# Patient Record
Sex: FEMALE | Race: WHITE | NOT HISPANIC OR LATINO
[De-identification: names, ages, dates, MRNs, and addresses within clinical notes are randomized per-mention and may not be internally consistent; named-entity substitution may affect disease eponyms.]

---

## 2024-03-27 ENCOUNTER — APPOINTMENT (OUTPATIENT)
Dept: PLASTIC SURGERY | Facility: CLINIC | Age: 69
End: 2024-03-27
Payer: SELF-PAY

## 2024-03-27 PROCEDURE — 99212 OFFICE O/P EST SF 10 MIN: CPT | Mod: NC

## 2024-04-04 PROBLEM — Z00.00 ENCOUNTER FOR PREVENTIVE HEALTH EXAMINATION: Status: ACTIVE | Noted: 2024-04-04

## 2024-04-04 NOTE — REASON FOR VISIT
[TextEntry] : consult on injectables. She made an appointment with Dr. Luong. no charge. Cheryle came to the event and her daughter is getting  in the fall. no charge

## 2024-04-18 ENCOUNTER — APPOINTMENT (OUTPATIENT)
Dept: PLASTIC SURGERY | Facility: CLINIC | Age: 69
End: 2024-04-18
Payer: SELF-PAY

## 2024-04-18 DIAGNOSIS — L98.8 OTHER SPECIFIED DISORDERS OF THE SKIN AND SUBCUTANEOUS TISSUE: ICD-10-CM

## 2024-04-18 DIAGNOSIS — Z85.3 PERSONAL HISTORY OF MALIGNANT NEOPLASM OF BREAST: ICD-10-CM

## 2024-04-18 DIAGNOSIS — Z86.79 PERSONAL HISTORY OF OTHER DISEASES OF THE CIRCULATORY SYSTEM: ICD-10-CM

## 2024-04-18 PROCEDURE — 99202 OFFICE O/P NEW SF 15 MIN: CPT

## 2024-04-18 RX ORDER — LEVOTHYROXINE SODIUM 88 UG/1
88 TABLET ORAL
Refills: 0 | Status: ACTIVE | COMMUNITY

## 2024-04-18 RX ORDER — HYDROXYUREA 500 MG/1
500 CAPSULE ORAL
Refills: 0 | Status: ACTIVE | COMMUNITY

## 2024-04-18 RX ORDER — METOPROLOL TARTRATE 50 MG/1
50 TABLET, FILM COATED ORAL
Refills: 0 | Status: ACTIVE | COMMUNITY

## 2024-04-18 RX ORDER — EMPAGLIFLOZIN 10 MG/1
10 TABLET, FILM COATED ORAL
Refills: 0 | Status: ACTIVE | COMMUNITY

## 2024-04-18 RX ORDER — APIXABAN 5 MG/1
5 TABLET, FILM COATED ORAL
Refills: 0 | Status: ACTIVE | COMMUNITY

## 2024-04-18 RX ORDER — SACUBITRIL AND VALSARTAN 49; 51 MG/1; MG/1
TABLET, FILM COATED ORAL
Refills: 0 | Status: ACTIVE | COMMUNITY

## 2024-04-18 NOTE — ASSESSMENT
[FreeTextEntry1] : SATHYA will decide if she wants to have botox and does not need to stop anticoagulant for that SATHYA if she wants belotero and not bruise excessively  should consult with cardiologist as she is not currently in a fib may be able to stop it for the procedure All of SATHYA 's questions and concerns were addressed and answered completely SATHYA will return to the office for a post procedure visit as needed

## 2024-04-18 NOTE — HISTORY OF PRESENT ILLNESS
[FreeTextEntry1] : SATHYA is 67 y/o wf c/o facial rhytids especially po  she has medical issues  with history of heart failure and atrial fibrillation  she is on elequis and entresto and jardianz and lasix and thyroid replacement . Dr Leroy is cardiologist SATHYA states she is no longer in atrial fibrillation and will ask cardiologist if she can come off at least temporarily for injections po SATHYA is prepared for botulinum to face and also belotero for po rhytids The risks, benefits, alternatives, limitations and the permanent scars were outlined with the patient. Discussion of risks included but not limited to bleeding, infection, delayed healing, and anesthetic risk. All of SATHYA 's questions and concerns were addressed and answered completely  The instructions were reviewed in detail with SATHYA.